# Patient Record
Sex: MALE | Race: WHITE | NOT HISPANIC OR LATINO | URBAN - METROPOLITAN AREA
[De-identification: names, ages, dates, MRNs, and addresses within clinical notes are randomized per-mention and may not be internally consistent; named-entity substitution may affect disease eponyms.]

---

## 2023-07-31 ENCOUNTER — INPATIENT (INPATIENT)
Facility: HOSPITAL | Age: 58
LOS: 0 days | Discharge: HOME | DRG: 918 | End: 2023-08-01
Attending: STUDENT IN AN ORGANIZED HEALTH CARE EDUCATION/TRAINING PROGRAM | Admitting: STUDENT IN AN ORGANIZED HEALTH CARE EDUCATION/TRAINING PROGRAM
Payer: COMMERCIAL

## 2023-07-31 VITALS
SYSTOLIC BLOOD PRESSURE: 172 MMHG | RESPIRATION RATE: 18 BRPM | OXYGEN SATURATION: 99 % | HEIGHT: 70 IN | WEIGHT: 164.91 LBS | TEMPERATURE: 98 F | DIASTOLIC BLOOD PRESSURE: 90 MMHG | HEART RATE: 83 BPM

## 2023-07-31 DIAGNOSIS — L03.90 CELLULITIS, UNSPECIFIED: ICD-10-CM

## 2023-07-31 LAB
ALBUMIN SERPL ELPH-MCNC: 3.8 G/DL — SIGNIFICANT CHANGE UP (ref 3.3–5)
ALP SERPL-CCNC: 59 U/L — SIGNIFICANT CHANGE UP (ref 40–120)
ALT FLD-CCNC: 38 U/L — SIGNIFICANT CHANGE UP (ref 12–78)
ANION GAP SERPL CALC-SCNC: 7 MMOL/L — SIGNIFICANT CHANGE UP (ref 5–17)
APPEARANCE UR: CLEAR — SIGNIFICANT CHANGE UP
APTT BLD: 31.3 SEC — SIGNIFICANT CHANGE UP (ref 24.5–35.6)
AST SERPL-CCNC: 19 U/L — SIGNIFICANT CHANGE UP (ref 15–37)
BASOPHILS # BLD AUTO: 0.03 K/UL — SIGNIFICANT CHANGE UP (ref 0–0.2)
BASOPHILS NFR BLD AUTO: 0.4 % — SIGNIFICANT CHANGE UP (ref 0–2)
BILIRUB SERPL-MCNC: 1.7 MG/DL — HIGH (ref 0.2–1.2)
BILIRUB UR-MCNC: NEGATIVE — SIGNIFICANT CHANGE UP
BUN SERPL-MCNC: 9 MG/DL — SIGNIFICANT CHANGE UP (ref 7–23)
CALCIUM SERPL-MCNC: 8.7 MG/DL — SIGNIFICANT CHANGE UP (ref 8.5–10.1)
CHLORIDE SERPL-SCNC: 106 MMOL/L — SIGNIFICANT CHANGE UP (ref 96–108)
CO2 SERPL-SCNC: 28 MMOL/L — SIGNIFICANT CHANGE UP (ref 22–31)
COLOR SPEC: YELLOW — SIGNIFICANT CHANGE UP
CREAT SERPL-MCNC: 1.2 MG/DL — SIGNIFICANT CHANGE UP (ref 0.5–1.3)
DIFF PNL FLD: NEGATIVE — SIGNIFICANT CHANGE UP
EGFR: 70 ML/MIN/1.73M2 — SIGNIFICANT CHANGE UP
EOSINOPHIL # BLD AUTO: 0.29 K/UL — SIGNIFICANT CHANGE UP (ref 0–0.5)
EOSINOPHIL NFR BLD AUTO: 3.6 % — SIGNIFICANT CHANGE UP (ref 0–6)
GLUCOSE SERPL-MCNC: 109 MG/DL — HIGH (ref 70–99)
GLUCOSE UR QL: 100 MG/DL
HCT VFR BLD CALC: 48.3 % — SIGNIFICANT CHANGE UP (ref 39–50)
HGB BLD-MCNC: 16.1 G/DL — SIGNIFICANT CHANGE UP (ref 13–17)
IMM GRANULOCYTES NFR BLD AUTO: 0.4 % — SIGNIFICANT CHANGE UP (ref 0–0.9)
INR BLD: 0.97 RATIO — SIGNIFICANT CHANGE UP (ref 0.85–1.18)
KETONES UR-MCNC: NEGATIVE MG/DL — SIGNIFICANT CHANGE UP
LACTATE SERPL-SCNC: 1.8 MMOL/L — SIGNIFICANT CHANGE UP (ref 0.7–2)
LEUKOCYTE ESTERASE UR-ACNC: NEGATIVE — SIGNIFICANT CHANGE UP
LYMPHOCYTES # BLD AUTO: 1.37 K/UL — SIGNIFICANT CHANGE UP (ref 1–3.3)
LYMPHOCYTES # BLD AUTO: 17.1 % — SIGNIFICANT CHANGE UP (ref 13–44)
MCHC RBC-ENTMCNC: 29.3 PG — SIGNIFICANT CHANGE UP (ref 27–34)
MCHC RBC-ENTMCNC: 33.3 GM/DL — SIGNIFICANT CHANGE UP (ref 32–36)
MCV RBC AUTO: 87.8 FL — SIGNIFICANT CHANGE UP (ref 80–100)
MONOCYTES # BLD AUTO: 0.73 K/UL — SIGNIFICANT CHANGE UP (ref 0–0.9)
MONOCYTES NFR BLD AUTO: 9.1 % — SIGNIFICANT CHANGE UP (ref 2–14)
NEUTROPHILS # BLD AUTO: 5.57 K/UL — SIGNIFICANT CHANGE UP (ref 1.8–7.4)
NEUTROPHILS NFR BLD AUTO: 69.4 % — SIGNIFICANT CHANGE UP (ref 43–77)
NITRITE UR-MCNC: NEGATIVE — SIGNIFICANT CHANGE UP
NRBC # BLD: 0 /100 WBCS — SIGNIFICANT CHANGE UP (ref 0–0)
PH UR: 5.5 — SIGNIFICANT CHANGE UP (ref 5–8)
PLATELET # BLD AUTO: 200 K/UL — SIGNIFICANT CHANGE UP (ref 150–400)
POTASSIUM SERPL-MCNC: 3.9 MMOL/L — SIGNIFICANT CHANGE UP (ref 3.5–5.3)
POTASSIUM SERPL-SCNC: 3.9 MMOL/L — SIGNIFICANT CHANGE UP (ref 3.5–5.3)
PROT SERPL-MCNC: 7.6 G/DL — SIGNIFICANT CHANGE UP (ref 6–8.3)
PROT UR-MCNC: NEGATIVE MG/DL — SIGNIFICANT CHANGE UP
PROTHROM AB SERPL-ACNC: 11.4 SEC — SIGNIFICANT CHANGE UP (ref 9.5–13)
RBC # BLD: 5.5 M/UL — SIGNIFICANT CHANGE UP (ref 4.2–5.8)
RBC # FLD: 13.2 % — SIGNIFICANT CHANGE UP (ref 10.3–14.5)
SODIUM SERPL-SCNC: 141 MMOL/L — SIGNIFICANT CHANGE UP (ref 135–145)
SP GR SPEC: 1.01 — SIGNIFICANT CHANGE UP (ref 1–1.03)
UROBILINOGEN FLD QL: 0.2 MG/DL — SIGNIFICANT CHANGE UP (ref 0.2–1)
WBC # BLD: 8.02 K/UL — SIGNIFICANT CHANGE UP (ref 3.8–10.5)
WBC # FLD AUTO: 8.02 K/UL — SIGNIFICANT CHANGE UP (ref 3.8–10.5)

## 2023-07-31 PROCEDURE — 93010 ELECTROCARDIOGRAM REPORT: CPT

## 2023-07-31 PROCEDURE — 99285 EMERGENCY DEPT VISIT HI MDM: CPT

## 2023-07-31 RX ORDER — DIPHENHYDRAMINE HCL 50 MG
50 CAPSULE ORAL ONCE
Refills: 0 | Status: COMPLETED | OUTPATIENT
Start: 2023-07-31 | End: 2023-07-31

## 2023-07-31 RX ORDER — VANCOMYCIN HCL 1 G
1000 VIAL (EA) INTRAVENOUS ONCE
Refills: 0 | Status: COMPLETED | OUTPATIENT
Start: 2023-07-31 | End: 2023-07-31

## 2023-07-31 RX ORDER — SODIUM CHLORIDE 9 MG/ML
1000 INJECTION INTRAMUSCULAR; INTRAVENOUS; SUBCUTANEOUS ONCE
Refills: 0 | Status: COMPLETED | OUTPATIENT
Start: 2023-07-31 | End: 2023-07-31

## 2023-07-31 RX ORDER — PIPERACILLIN AND TAZOBACTAM 4; .5 G/20ML; G/20ML
3.38 INJECTION, POWDER, LYOPHILIZED, FOR SOLUTION INTRAVENOUS ONCE
Refills: 0 | Status: COMPLETED | OUTPATIENT
Start: 2023-07-31 | End: 2023-07-31

## 2023-07-31 RX ORDER — PIPERACILLIN AND TAZOBACTAM 4; .5 G/20ML; G/20ML
3.38 INJECTION, POWDER, LYOPHILIZED, FOR SOLUTION INTRAVENOUS ONCE
Refills: 0 | Status: COMPLETED | OUTPATIENT
Start: 2023-08-01 | End: 2023-08-01

## 2023-07-31 RX ADMIN — Medication 1000 MILLIGRAM(S): at 23:45

## 2023-07-31 RX ADMIN — SODIUM CHLORIDE 1000 MILLILITER(S): 9 INJECTION INTRAMUSCULAR; INTRAVENOUS; SUBCUTANEOUS at 23:29

## 2023-07-31 RX ADMIN — PIPERACILLIN AND TAZOBACTAM 3.38 GRAM(S): 4; .5 INJECTION, POWDER, LYOPHILIZED, FOR SOLUTION INTRAVENOUS at 23:00

## 2023-07-31 RX ADMIN — PIPERACILLIN AND TAZOBACTAM 200 GRAM(S): 4; .5 INJECTION, POWDER, LYOPHILIZED, FOR SOLUTION INTRAVENOUS at 22:15

## 2023-07-31 RX ADMIN — Medication 50 MILLIGRAM(S): at 22:14

## 2023-07-31 RX ADMIN — Medication 250 MILLIGRAM(S): at 22:45

## 2023-07-31 RX ADMIN — SODIUM CHLORIDE 1000 MILLILITER(S): 9 INJECTION INTRAMUSCULAR; INTRAVENOUS; SUBCUTANEOUS at 22:15

## 2023-07-31 NOTE — ED ADULT NURSE NOTE - OBJECTIVE STATEMENT
pt had bee sting yesterday at 5 pm on left lower ulnar area. today arm swelling, pain, redness traveling up to left upper inner arm and no experiencing some throat tightness. pt denies chest pain, sob, distress. no difficulty breathing at this time. pt reports chills since 4 p, today but no fever. took antihistamine at 2000 with advil and tylenol

## 2023-07-31 NOTE — ED PROVIDER NOTE - CLINICAL SUMMARY MEDICAL DECISION MAKING FREE TEXT BOX
acute left forearm pain, swelling, warmth and erythema,   red lymphatic streak  going up left arm IVAB, admit to medicine

## 2023-07-31 NOTE — ED ADULT NURSE NOTE - CHIEF COMPLAINT QUOTE
pt reports bee sting to left forearm yesterday. pt now has swelling and redness going up left arm with chills. pt took an antihistamine today Prenatal Vitamins

## 2023-07-31 NOTE — ED PROVIDER NOTE - SKIN, MLM
Skin left forearm pain, swelling, warmth and erythema x 1 day, he has a  red lymphatic streak  going up left arm

## 2023-07-31 NOTE — ED ADULT TRIAGE NOTE - CHIEF COMPLAINT QUOTE
pt reports bee sting to left forearm yesterday. pt now has swelling and redness going up left arm with chills. pt took an antihistamine today

## 2023-07-31 NOTE — ED ADULT NURSE NOTE - NSFALLUNIVINTERV_ED_ALL_ED
Bed/Stretcher in lowest position, wheels locked, appropriate side rails in place/Call bell, personal items and telephone in reach/Instruct patient to call for assistance before getting out of bed/chair/stretcher/Non-slip footwear applied when patient is off stretcher/[place] to call system/Physically safe environment - no spills, clutter or unnecessary equipment/Purposeful proactive rounding/Room/bathroom lighting operational, light cord in reach

## 2023-07-31 NOTE — ED PROVIDER NOTE - OTHER FINDINGS
normal
Within functional limits/no evidence of green dye in suctioned secretions immediately following and 1 hour later

## 2023-07-31 NOTE — ED PROVIDER NOTE - OBJECTIVE STATEMENT
pt reports bee sting to left forearm yesterday. pt now has swelling and redness going up left arm with chills. pt took an antihistamine today  bite, insect pt reports bee sting to left forearm yesterday. pt now has swelling and redness going up left arm with chills. pt took an antihistamine today  left arm cellulitis, lymphangitis due to an insect sting x 24 hours, he had chills, no fever pt reports bee sting to left forearm yesterday. pt now has swelling and redness going up left arm with chills. pt took an antihistamine today  59 y/o male with left arm cellulitis, lymphangitis due to an insect sting x 24 hours, he had chills, no fever 59 y/o male C/C insect sting that developed into left forearm pain, swelling, warmth and erythema x 1 day, he has a  red lymphatic streak  going up left arm. He experienced  chills, no fever. pt took an antihistamine today

## 2023-07-31 NOTE — ED PROVIDER NOTE - ASSOCIATED SYMPTOMS
left forearm pain, swelling, warmth and erythema x 1 day, he has a  red lymphatic streak  going up left arm

## 2023-07-31 NOTE — ED ADULT TRIAGE NOTE - PAIN RATING/NUMBER SCALE (0-10): ACTIVITY
Please call his daughter and find out how he is doing.  I have not seen him since November.  Let daughter know his dentist wanted me to clear him to be given a sedative, and I just wanted to make sure there had been no change in his health, no breathing problems, no decrease in strength, no sleeping problems.    8 (severe pain)

## 2023-08-01 VITALS
TEMPERATURE: 98 F | HEART RATE: 73 BPM | DIASTOLIC BLOOD PRESSURE: 80 MMHG | OXYGEN SATURATION: 97 % | RESPIRATION RATE: 18 BRPM | SYSTOLIC BLOOD PRESSURE: 154 MMHG

## 2023-08-01 DIAGNOSIS — Z98.890 OTHER SPECIFIED POSTPROCEDURAL STATES: Chronic | ICD-10-CM

## 2023-08-01 DIAGNOSIS — Z90.49 ACQUIRED ABSENCE OF OTHER SPECIFIED PARTS OF DIGESTIVE TRACT: Chronic | ICD-10-CM

## 2023-08-01 DIAGNOSIS — R03.0 ELEVATED BLOOD-PRESSURE READING, WITHOUT DIAGNOSIS OF HYPERTENSION: ICD-10-CM

## 2023-08-01 DIAGNOSIS — I89.1 LYMPHANGITIS: ICD-10-CM

## 2023-08-01 DIAGNOSIS — E78.5 HYPERLIPIDEMIA, UNSPECIFIED: ICD-10-CM

## 2023-08-01 LAB
ALBUMIN SERPL ELPH-MCNC: 3.4 G/DL — SIGNIFICANT CHANGE UP (ref 3.3–5)
ALP SERPL-CCNC: 56 U/L — SIGNIFICANT CHANGE UP (ref 40–120)
ALT FLD-CCNC: 40 U/L — SIGNIFICANT CHANGE UP (ref 12–78)
ANION GAP SERPL CALC-SCNC: 7 MMOL/L — SIGNIFICANT CHANGE UP (ref 5–17)
AST SERPL-CCNC: 26 U/L — SIGNIFICANT CHANGE UP (ref 15–37)
BASOPHILS # BLD AUTO: 0.03 K/UL — SIGNIFICANT CHANGE UP (ref 0–0.2)
BASOPHILS NFR BLD AUTO: 0.5 % — SIGNIFICANT CHANGE UP (ref 0–2)
BILIRUB SERPL-MCNC: 2.1 MG/DL — HIGH (ref 0.2–1.2)
BUN SERPL-MCNC: 8 MG/DL — SIGNIFICANT CHANGE UP (ref 7–23)
CALCIUM SERPL-MCNC: 8.3 MG/DL — LOW (ref 8.5–10.1)
CHLORIDE SERPL-SCNC: 109 MMOL/L — HIGH (ref 96–108)
CHOLEST SERPL-MCNC: 171 MG/DL — SIGNIFICANT CHANGE UP
CO2 SERPL-SCNC: 26 MMOL/L — SIGNIFICANT CHANGE UP (ref 22–31)
CREAT SERPL-MCNC: 1.2 MG/DL — SIGNIFICANT CHANGE UP (ref 0.5–1.3)
EGFR: 70 ML/MIN/1.73M2 — SIGNIFICANT CHANGE UP
EOSINOPHIL # BLD AUTO: 0.26 K/UL — SIGNIFICANT CHANGE UP (ref 0–0.5)
EOSINOPHIL NFR BLD AUTO: 4.3 % — SIGNIFICANT CHANGE UP (ref 0–6)
GLUCOSE SERPL-MCNC: 106 MG/DL — HIGH (ref 70–99)
HCT VFR BLD CALC: 46.4 % — SIGNIFICANT CHANGE UP (ref 39–50)
HDLC SERPL-MCNC: 33 MG/DL — LOW
HGB BLD-MCNC: 15.5 G/DL — SIGNIFICANT CHANGE UP (ref 13–17)
IMM GRANULOCYTES NFR BLD AUTO: 0.3 % — SIGNIFICANT CHANGE UP (ref 0–0.9)
LIPID PNL WITH DIRECT LDL SERPL: 119 MG/DL — HIGH
LYMPHOCYTES # BLD AUTO: 1.17 K/UL — SIGNIFICANT CHANGE UP (ref 1–3.3)
LYMPHOCYTES # BLD AUTO: 19.5 % — SIGNIFICANT CHANGE UP (ref 13–44)
MCHC RBC-ENTMCNC: 28.9 PG — SIGNIFICANT CHANGE UP (ref 27–34)
MCHC RBC-ENTMCNC: 33.4 GM/DL — SIGNIFICANT CHANGE UP (ref 32–36)
MCV RBC AUTO: 86.4 FL — SIGNIFICANT CHANGE UP (ref 80–100)
MONOCYTES # BLD AUTO: 0.57 K/UL — SIGNIFICANT CHANGE UP (ref 0–0.9)
MONOCYTES NFR BLD AUTO: 9.5 % — SIGNIFICANT CHANGE UP (ref 2–14)
NEUTROPHILS # BLD AUTO: 3.96 K/UL — SIGNIFICANT CHANGE UP (ref 1.8–7.4)
NEUTROPHILS NFR BLD AUTO: 65.9 % — SIGNIFICANT CHANGE UP (ref 43–77)
NON HDL CHOLESTEROL: 139 MG/DL — HIGH
NRBC # BLD: 0 /100 WBCS — SIGNIFICANT CHANGE UP (ref 0–0)
PLATELET # BLD AUTO: 186 K/UL — SIGNIFICANT CHANGE UP (ref 150–400)
POTASSIUM SERPL-MCNC: 4.2 MMOL/L — SIGNIFICANT CHANGE UP (ref 3.5–5.3)
POTASSIUM SERPL-SCNC: 4.2 MMOL/L — SIGNIFICANT CHANGE UP (ref 3.5–5.3)
PROT SERPL-MCNC: 7 G/DL — SIGNIFICANT CHANGE UP (ref 6–8.3)
RBC # BLD: 5.37 M/UL — SIGNIFICANT CHANGE UP (ref 4.2–5.8)
RBC # FLD: 13.1 % — SIGNIFICANT CHANGE UP (ref 10.3–14.5)
SODIUM SERPL-SCNC: 142 MMOL/L — SIGNIFICANT CHANGE UP (ref 135–145)
TRIGL SERPL-MCNC: 106 MG/DL — SIGNIFICANT CHANGE UP
WBC # BLD: 6.01 K/UL — SIGNIFICANT CHANGE UP (ref 3.8–10.5)
WBC # FLD AUTO: 6.01 K/UL — SIGNIFICANT CHANGE UP (ref 3.8–10.5)

## 2023-08-01 PROCEDURE — 96365 THER/PROPH/DIAG IV INF INIT: CPT

## 2023-08-01 PROCEDURE — 99285 EMERGENCY DEPT VISIT HI MDM: CPT | Mod: 25

## 2023-08-01 PROCEDURE — 96375 TX/PRO/DX INJ NEW DRUG ADDON: CPT

## 2023-08-01 PROCEDURE — 81003 URINALYSIS AUTO W/O SCOPE: CPT

## 2023-08-01 PROCEDURE — 99223 1ST HOSP IP/OBS HIGH 75: CPT | Mod: GC

## 2023-08-01 PROCEDURE — 99253 IP/OBS CNSLTJ NEW/EST LOW 45: CPT

## 2023-08-01 PROCEDURE — 85025 COMPLETE CBC W/AUTO DIFF WBC: CPT

## 2023-08-01 PROCEDURE — 85610 PROTHROMBIN TIME: CPT

## 2023-08-01 PROCEDURE — 80053 COMPREHEN METABOLIC PANEL: CPT

## 2023-08-01 PROCEDURE — 96368 THER/DIAG CONCURRENT INF: CPT

## 2023-08-01 PROCEDURE — 99239 HOSP IP/OBS DSCHRG MGMT >30: CPT

## 2023-08-01 PROCEDURE — 80061 LIPID PANEL: CPT

## 2023-08-01 PROCEDURE — 93005 ELECTROCARDIOGRAM TRACING: CPT

## 2023-08-01 PROCEDURE — 85730 THROMBOPLASTIN TIME PARTIAL: CPT

## 2023-08-01 PROCEDURE — 87086 URINE CULTURE/COLONY COUNT: CPT

## 2023-08-01 PROCEDURE — 36415 COLL VENOUS BLD VENIPUNCTURE: CPT

## 2023-08-01 PROCEDURE — 83605 ASSAY OF LACTIC ACID: CPT

## 2023-08-01 PROCEDURE — 87040 BLOOD CULTURE FOR BACTERIA: CPT

## 2023-08-01 RX ORDER — DIPHENHYDRAMINE HCL 50 MG
25 CAPSULE ORAL EVERY 12 HOURS
Refills: 0 | Status: DISCONTINUED | OUTPATIENT
Start: 2023-08-01 | End: 2023-08-01

## 2023-08-01 RX ORDER — VANCOMYCIN HCL 1 G
1000 VIAL (EA) INTRAVENOUS EVERY 12 HOURS
Refills: 0 | Status: DISCONTINUED | OUTPATIENT
Start: 2023-08-01 | End: 2023-08-01

## 2023-08-01 RX ORDER — LACTOBACILLUS ACIDOPHILUS 100MM CELL
1 CAPSULE ORAL
Qty: 12 | Refills: 0
Start: 2023-08-01 | End: 2023-08-06

## 2023-08-01 RX ORDER — CEPHALEXIN 500 MG
1 CAPSULE ORAL
Qty: 12 | Refills: 0
Start: 2023-08-01 | End: 2023-08-06

## 2023-08-01 RX ORDER — DIPHENHYDRAMINE HCL 50 MG
1 CAPSULE ORAL
Qty: 10 | Refills: 0
Start: 2023-08-01 | End: 2023-08-05

## 2023-08-01 RX ORDER — LACTOBACILLUS ACIDOPHILUS 100MM CELL
1 CAPSULE ORAL
Refills: 0 | Status: DISCONTINUED | OUTPATIENT
Start: 2023-08-01 | End: 2023-08-01

## 2023-08-01 RX ORDER — ONDANSETRON 8 MG/1
4 TABLET, FILM COATED ORAL EVERY 8 HOURS
Refills: 0 | Status: DISCONTINUED | OUTPATIENT
Start: 2023-08-01 | End: 2023-08-01

## 2023-08-01 RX ORDER — ACETAMINOPHEN 500 MG
650 TABLET ORAL EVERY 6 HOURS
Refills: 0 | Status: DISCONTINUED | OUTPATIENT
Start: 2023-08-01 | End: 2023-08-01

## 2023-08-01 RX ORDER — PIPERACILLIN AND TAZOBACTAM 4; .5 G/20ML; G/20ML
3.38 INJECTION, POWDER, LYOPHILIZED, FOR SOLUTION INTRAVENOUS EVERY 8 HOURS
Refills: 0 | Status: DISCONTINUED | OUTPATIENT
Start: 2023-08-01 | End: 2023-08-01

## 2023-08-01 RX ORDER — LANOLIN ALCOHOL/MO/W.PET/CERES
3 CREAM (GRAM) TOPICAL AT BEDTIME
Refills: 0 | Status: DISCONTINUED | OUTPATIENT
Start: 2023-08-01 | End: 2023-08-01

## 2023-08-01 RX ORDER — CEPHALEXIN 500 MG
500 CAPSULE ORAL EVERY 12 HOURS
Refills: 0 | Status: DISCONTINUED | OUTPATIENT
Start: 2023-08-01 | End: 2023-08-01

## 2023-08-01 RX ADMIN — PIPERACILLIN AND TAZOBACTAM 25 GRAM(S): 4; .5 INJECTION, POWDER, LYOPHILIZED, FOR SOLUTION INTRAVENOUS at 09:30

## 2023-08-01 RX ADMIN — PIPERACILLIN AND TAZOBACTAM 25 GRAM(S): 4; .5 INJECTION, POWDER, LYOPHILIZED, FOR SOLUTION INTRAVENOUS at 01:02

## 2023-08-01 NOTE — DISCHARGE NOTE PROVIDER - CARE PROVIDER_API CALL
taran wolf  [address]  [place] [address]  Phone: [phone]  Fax: (   )    -  Follow Up Time: 1 week

## 2023-08-01 NOTE — H&P ADULT - PROBLEM SELECTOR PLAN 3
Chronic, however pt reports myalgias with use  - Pt previously on Atorvastatin in past and transitioned to Pravastatin 20 mg with continued muscle aches   - Now takes 10 mg every other day at his own discretion, MD unaware   - AM lipid panel, follow up results   - Statin held at this time

## 2023-08-01 NOTE — DISCHARGE NOTE PROVIDER - NSDCMRMEDTOKEN_GEN_ALL_CORE_FT
cephalexin 500 mg oral capsule: 1 cap(s) orally every 12 hours  diphenhydrAMINE 25 mg oral capsule: 1 cap(s) orally every 12 hours  lactobacillus acidophilus oral capsule: 1 tab(s) orally 2 times a day  pravastatin 20 mg oral tablet: 1 tab(s) orally once a day

## 2023-08-01 NOTE — CARE COORDINATION ASSESSMENT. - NSCAREPROVIDERS_GEN_ALL_CORE_FT
CARE PROVIDERS:  Accepting Physician: Briana Weiss  Administration: Santy Palm  Admitting: Briana Weiss  Attending: Briana Weiss  Case Management: Clifford Lugo  Consultant: Amanda Ariza ED Attending: Herbie Tesfaye  ED Nurse: Cr Lowry  Nurse: Maia Satnillan  Nurse: Winifred Sheppard  Nurse: Milton Billings  Ordered: ADM, User  Override: Winifred Sheppard  Override: Miltno Billings  PCA/Nursing Assistant: Maia Sears  PCA/Nursing Assistant: Hillary Red  Primary Team: Weston Landaverde  Primary Team: Priscila Quinonez  Primary Team: Briana Weiss  Primary Team: Rakan Nicholson  Registered Dietitian: Ruba White  UR// Supp. Assoc.: Dorothy Chandler  UR// Supp. Assoc.: Aimee Tobin

## 2023-08-01 NOTE — H&P ADULT - HISTORY OF PRESENT ILLNESS
57 yo M with PMHx of HLD presents to the ED with worsening swelling of his left arm following an insect sting. Pt states a on 7/30 at 1700 pt felt a sharp pain in his left wrist and found a stinger. Pt removed the stinger and appreciated minimal swelling and pain at the site for the remainder of the evening. The following morning ot states that he noticed worsening swelling, redness, and tingling in his throat. Pt took Alavert and benadryl with minimal improvement. Pt decided to come to the ED when he noticed the erythema extending up his left arm with more pronounced swelling. Pt endorses numbness and tingling in the left upper extremity with intermittent chills. Denies fever. No other complaints at this time.     ED Course:   Vitals: BP: 172/90, HR: 80, Temp: 98.4, RR: 18, SpO2: 98% on RA   Labs:  WBC 8.02, Cr 1.20  UA: negative   Received in the ED: Vanco IVP, Zosyn IVPB x2, 1L NS bolus, benadryl 50 mg IVP    59 yo M with PMHx of HLD presents to the ED with worsening swelling of his left arm following an insect sting. Pt states a on 7/30 at 1700 pt felt a sharp pain in his left wrist and found a stinger. Pt removed the stinger and appreciated minimal swelling and pain at the site for the remainder of the evening. The following morning ot states that he noticed worsening swelling, redness, and tingling in his throat. Pt took Alavert and benadryl with minimal improvement. Pt decided to come to the ED when he noticed the erythema extending up his left arm with more pronounced swelling. Pt endorses numbness and tingling in the left upper extremity with intermittent chills. Denies fever. No other complaints at this time.   ED Course:   Vitals: BP: 172/90, HR: 80, Temp: 98.4, RR: 18, SpO2: 98% on RA   Labs:  WBC 8.02, Cr 1.20  UA: negative   Received in the ED: Vanco IVP, Zosyn IVPB x2, 1L NS bolus, benadryl 50 mg IVP

## 2023-08-01 NOTE — H&P ADULT - ASSESSMENT
57 yo M with PMHx of HLD presents to the ED with worsening swelling of his left arm following an insect sting, admitted for lymphangitis

## 2023-08-01 NOTE — H&P ADULT - NSHPSOCIALHISTORY_GEN_ALL_CORE
Lives: Wife and adult children   ADLs: Independent   Diet: No restrictions   Vaccination: Moderna x4   Alcohol Use: 2-3 drinks, 3x/week   Tobacco Use: 3 pack year hx, 1 pack per week, quit 18 years ago   Recreational Drug Use: Marijuana gummies 1x/week

## 2023-08-01 NOTE — H&P ADULT - NSICDXPASTSURGICALHX_GEN_ALL_CORE_FT
PAST SURGICAL HISTORY:  S/P cholecystectomy     S/P hernia repair     S/P rotator cuff repair     Status post repair of glenoid labrum

## 2023-08-01 NOTE — CARE COORDINATION ASSESSMENT. - NSPASTMEDSURGHISTORY_GEN_ALL_CORE_FT
PAST MEDICAL & SURGICAL HISTORY:  Hyperlipidemia      Status post repair of glenoid labrum      S/P rotator cuff repair      S/P cholecystectomy      S/P hernia repair

## 2023-08-01 NOTE — H&P ADULT - ATTENDING COMMENTS
59 yo M with PMHx of HLD presents to the ED with worsening swelling of his left arm following an insect sting, admitted for lymphangitis     Agree with above. Edited where appropriate

## 2023-08-01 NOTE — DISCHARGE NOTE NURSING/CASE MANAGEMENT/SOCIAL WORK - PATIENT PORTAL LINK FT
You can access the FollowMyHealth Patient Portal offered by [facility] by registering at the following website: [email or web address]. By joining [facility]’s FollowMyHealth portal, you will also be able to view your health information using other applications (apps) compatible with our system.

## 2023-08-01 NOTE — DISCHARGE NOTE PROVIDER - DISCHARGING ATTENDING PHYSICIAN:
Vital Signs Last 24 Hrs  T(C): 36.6 (16 Sep 2020 04:14), Max: 37.2 (15 Sep 2020 17:27)  T(F): 97.9 (16 Sep 2020 04:14), Max: 98.9 (15 Sep 2020 17:27)  HR: 68 (16 Sep 2020 06:34) (66 - 80)  BP: 144/72 (16 Sep 2020 06:34) (130/72 - 163/78)  BP(mean): --  RR: 18 (16 Sep 2020 04:14) (16 - 18)  SpO2: 98% (16 Sep 2020 04:14) (98% - 98%)
Rakan Nicholson

## 2023-08-01 NOTE — H&P ADULT - NSHPPHYSICALEXAM_GEN_ALL_CORE
T(C): 36.9 (07-31-23 @ 19:48), Max: 36.9 (07-31-23 @ 19:48)  HR: 80 (07-31-23 @ 21:36) (80 - 83)  BP: 172/90 (07-31-23 @ 19:48) (172/90 - 172/90)  RR: 18 (07-31-23 @ 21:36) (18 - 18)  SpO2: 98% (07-31-23 @ 21:36) (98% - 99%)    GENERAL: patient appears well, no acute distress, appropriately interactive  EYES: sclera clear, no exudates  ENMT: oropharynx clear without erythema, no exudates, moist mucous membranes  NECK: supple, soft  LUNGS: good air entry bilaterally, clear to auscultation, symmetric breath sounds, no wheezing or rhonchi appreciated  HEART: soft S1/S2, regular rate and rhythm, no murmurs noted, no lower extremity edema  GASTROINTESTINAL: abdomen is soft, nontender, nondistended, normoactive bowel sounds  EXTREMITY: 1mm sting bhavani on medial aspect of left wrist, erythema and edema extending up to the medial aspect of the left bicep, boarders marked with pen   LYMPHATIC: No palpable lymphadenopathy in LUE   INTEGUMENT: good skin turgor, warm skin, appears well perfused  NEUROLOGIC: awake, alert, oriented x3, good muscle tone in all 4 extremities  HEME/LYMPH: no obvious ecchymosis or petechiae T(C): 36.9 (07-31-23 @ 19:48), Max: 36.9 (07-31-23 @ 19:48)  HR: 80 (07-31-23 @ 21:36) (80 - 83)  BP: 172/90 (07-31-23 @ 19:48) (172/90 - 172/90)  RR: 18 (07-31-23 @ 21:36) (18 - 18)  SpO2: 98% (07-31-23 @ 21:36) (98% - 99%)  GENERAL: patient appears well, no acute distress, appropriately interactive  EYES: sclera clear, no exudates  ENMT: oropharynx clear without erythema, no exudates, moist mucous membranes  NECK: supple, soft  LUNGS: good air entry bilaterally, clear to auscultation, symmetric breath sounds, no wheezing or rhonchi appreciated  HEART: soft S1/S2, regular rate and rhythm, no murmurs noted, no lower extremity edema  GASTROINTESTINAL: abdomen is soft, nontender, nondistended, normoactive bowel sounds  EXTREMITY: 1mm sting bhavani on medial aspect of left wrist, erythema and edema extending up to the medial aspect of the left bicep, boarders marked with pen   LYMPHATIC: No palpable lymphadenopathy in LUE   INTEGUMENT: good skin turgor, warm skin, appears well perfused  NEUROLOGIC: awake, alert, oriented x3, good muscle tone in all 4 extremities  HEME/LYMPH: no obvious ecchymosis or petechiae

## 2023-08-01 NOTE — DISCHARGE NOTE PROVIDER - NSDCCPCAREPLAN_GEN_ALL_CORE_FT
PRINCIPAL DISCHARGE DIAGNOSIS  Diagnosis: Lymphangitis  Assessment and Plan of Treatment: Arm elevation recommended, continue benadryl and complete cours oe fkeflex for antibitics as directed.  Follow up with PMD by next week.

## 2023-08-01 NOTE — DISCHARGE NOTE PROVIDER - HOSPITAL COURSE
HPI:  59 yo M with PMHx of HLD presents to the ED with worsening swelling of his left arm following an insect sting. Pt states a on 7/30 at 1700 pt felt a sharp pain in his left wrist and found a stinger. Pt removed the stinger and appreciated minimal swelling and pain at the site for the remainder of the evening. The following morning ot states that he noticed worsening swelling, redness, and tingling in his throat. Pt took Alavert and benadryl with minimal improvement. Pt decided to come to the ED when he noticed the erythema extending up his left arm with more pronounced swelling. Pt endorses numbness and tingling in the left upper extremity with intermittent chills. Denies fever. No other complaints at this time.   ED Course:   Vitals: BP: 172/90, HR: 80, Temp: 98.4, RR: 18, SpO2: 98% on RA   Labs:  WBC 8.02, Cr 1.20  UA: negative   Received in the ED: Vanco IVP, Zosyn IVPB x2, 1L NS bolus, benadryl 50 mg IVP    (01 Aug 2023 02:04)    HOSPITAL COURSE:    Patient felt much better after he was given benadryl IV in ED, and the swelling in his arm, along with erythematous track subsequently subsided. Pt was continued on IV abx, and was evaluated by Dr. Ariza in ED, who recommended changing over to PO keflex to complete a 7 day course of abx, and continue benadryl po, and arm elevation.  Patient live sin [place] and will follow up with PMD there.    REVIEW OF SYSTEMS:    CONSTITUTIONAL: No weakness, fevers or chills  EYES/ENT: No visual changes, no throat pain   RESPIRATORY: No cough, wheezing, hemoptysis; No shortness of breath  CARDIOVASCULAR: No chest pain or palpitations  GASTROINTESTINAL: No abdominal, nausea, vomiting, or hematemesis; No diarrhea or constipation. No melena or hematochezia.  GENITOURINARY: No dysuria, frequency or hematuria  NEUROLOGICAL: No dizziness, numbness, or weakness  SKIN: No itching, burning, rashes, or lesions   All other review of systems is negative unless indicated above.    VITAL SIGNS:    Vital Signs Last 24 Hrs  T(C): 36.4 (01 Aug 2023 05:11), Max: 36.9 (31 Jul 2023 19:48)  T(F): 97.6 (01 Aug 2023 05:11), Max: 98.4 (31 Jul 2023 19:48)  HR: 54 (01 Aug 2023 05:11) (54 - 83)  BP: 136/85 (01 Aug 2023 05:11) (127/73 - 172/90)  BP(mean): --  RR: 16 (01 Aug 2023 05:11) (16 - 18)  SpO2: 95% (01 Aug 2023 05:11) (95% - 99%)    Parameters below as of 01 Aug 2023 05:11  Patient On (Oxygen Delivery Method): room air        PHYSICAL EXAM:     GENERAL: no acute distress  HEENT: NC/AT, EOMI, neck supple, MMM  RESPIRATORY: LCTAB/L, no rhonchi, rales, or wheezing  CARDIOVASCULAR: RRR, no murmurs, gallops, rubs  ABDOMINAL: soft, non-tender, non-distended, positive bowel sounds   EXTREMITIES: no clubbing, cyanosis, or edema; LUE without erythema, but some tightness and swelling  NEUROLOGICAL: alert and oriented x 3, non-focal  SKIN: no new rashes, no ticks seen  MUSCULOSKELETAL: no gross joint deformity                          15.5   6.01  )-----------( 186      ( 01 Aug 2023 08:40 )             46.4     08-01    142  |  109<H>  |  8   ----------------------------<  106<H>  4.2   |  26  |  1.20    Ca    8.3<L>      01 Aug 2023 08:40    TPro  7.0  /  Alb  3.4  /  TBili  2.1<H>  /  DBili  x   /  AST  26  /  ALT  40  /  AlkPhos  56  08-01      CAPILLARY BLOOD GLUCOSE          MEDICATIONS  (STANDING):  cephalexin 500 milliGRAM(s) Oral every 12 hours  diphenhydrAMINE 25 milliGRAM(s) Oral every 12 hours  lactobacillus acidophilus 1 Tablet(s) Oral two times a day with meals

## 2023-08-01 NOTE — DISCHARGE NOTE PROVIDER - PROVIDER TOKENS
FREE:[LAST:[luciano],FIRST:[taran],PHONE:[phone]],FAX:[(   )    -],ADDRESS:[[address] [place] [address]],FOLLOWUP:[1 week]]

## 2023-08-01 NOTE — CONSULT NOTE ADULT - SUBJECTIVE AND OBJECTIVE BOX
[facility]  INFECTIOUS DISEASES   [address] [place] [address]  Tel: [phone]     Fax: [phone]  ========================================================  MD Marian Zavala Kaushal, MD Cho, Michelle, MD Sunjit, Jaspal, MD  ========================================================    [id number]  [facility]     CC: Patient is a 58y old  Male who presents with a chief complaint of Lymphangitis (01 Aug 2023 02:04)    HPI:  59 yo M with PMHx of HLD presents to the ED with worsening swelling of his left arm following an insect sting. Pt states a on 7/30 at 1700 pt felt a sharp pain in his left wrist and found a stinger. Pt removed the stinger and appreciated minimal swelling and pain at the site for the remainder of the evening. The following morning ot states that he noticed worsening swelling, redness, and tingling in his throat. Pt took Alavert and benadryl with minimal improvement. Pt decided to come to the ED when he noticed the erythema extending up his left arm with more pronounced swelling. Pt endorses numbness and tingling in the left upper extremity with intermittent chills. Denies fever. No other complaints at this time.   ED Course:   Vitals: BP: 172/90, HR: 80, Temp: 98.4, RR: 18, SpO2: 98% on RA   Labs:  WBC 8.02, Cr 1.20  UA: negative   Received in the ED: Vanco IVP, Zosyn IVPB x2, 1L NS bolus, benadryl 50 mg IVP    (01 Aug 2023 02:04)      PAST MEDICAL & SURGICAL HISTORY:  Hyperlipidemia      S/P hernia repair      S/P cholecystectomy      S/P rotator cuff repair      Status post repair of glenoid labrum          Social Hx: No smoking, EtOH or drugs     FAMILY HISTORY:  No pertinent family history in first degree relatives        Allergies    No Known Allergies    Intolerances        Antibiotics:  MEDICATIONS  (STANDING):    MEDICATIONS  (PRN):  acetaminophen     Tablet .. 650 milliGRAM(s) Oral every 6 hours PRN Mild Pain (1 - 3)  aluminum hydroxide/magnesium hydroxide/simethicone Suspension 30 milliLiter(s) Oral every 4 hours PRN Dyspepsia  melatonin 3 milliGRAM(s) Oral at bedtime PRN Insomnia  ondansetron Injectable 4 milliGRAM(s) IV Push every 8 hours PRN Nausea and/or Vomiting       REVIEW OF SYSTEMS:  CONSTITUTIONAL:  No Fever or chills  HEENT:  No diplopia or blurred vision.   CARDIOVASCULAR:  No chest pain or SOB.  RESPIRATORY:  No cough, shortness of breath, PND or orthopnea.  GASTROINTESTINAL:  No nausea, vomiting or diarrhea.  GENITOURINARY:  No dysuria, frequency or urgency. No Blood in urine  MUSCULOSKELETAL:  no joint aches, no muscle pain  SKIN:  swelling of L arm   NEUROLOGIC:  No paresthesias or weakness.      Physical Exam:  Vital Signs Last 24 Hrs  T(C): 36.4 (01 Aug 2023 05:11), Max: 36.9 (31 Jul 2023 19:48)  T(F): 97.6 (01 Aug 2023 05:11), Max: 98.4 (31 Jul 2023 19:48)  HR: 54 (01 Aug 2023 05:11) (54 - 83)  BP: 136/85 (01 Aug 2023 05:11) (127/73 - 172/90)  BP(mean): --  RR: 16 (01 Aug 2023 05:11) (16 - 18)  SpO2: 95% (01 Aug 2023 05:11) (95% - 99%)    Parameters below as of 01 Aug 2023 05:11  Patient On (Oxygen Delivery Method): room air      Height (cm): 177.8 (07-31 @ 19:48)  Weight (kg): 74.8 (07-31 @ 19:48)  BMI (kg/m2): 23.7 (07-31 @ 19:48)  BSA (m2): 1.92 (07-31 @ 19:48)    GEN: NAD  HEENT: normocephalic and atraumatic. EOMI. PERRL.    NECK: Supple.  No lymphadenopathy   LUNGS: Clear to auscultation.  HEART: Regular rate and rhythm without murmur.  ABDOMEN: Soft, nontender, and nondistended.  Positive bowel sounds.    : No CVA tenderness  EXTREMITIES: Without edema.  NEUROLOGIC: grossly intact.  PSYCHIATRIC: Appropriate affect .  SKIN: No rash     Labs:  08-01    142  |  109<H>  |  8   ----------------------------<  106<H>  4.2   |  26  |  1.20    Ca    8.3<L>      01 Aug 2023 08:40    TPro  7.0  /  Alb  3.4  /  TBili  2.1<H>  /  DBili  x   /  AST  26  /  ALT  40  /  AlkPhos  56  08-01                          15.5   6.01  )-----------( 186      ( 01 Aug 2023 08:40 )             46.4     PT/INR - ( 31 Jul 2023 22:15 )   PT: 11.4 sec;   INR: 0.97 ratio         PTT - ( 31 Jul 2023 22:15 )  PTT:31.3 sec  Urinalysis Basic - ( 01 Aug 2023 08:40 )    Color: x / Appearance: x / SG: x / pH: x  Gluc: 106 mg/dL / Ketone: x  / Bili: x / Urobili: x   Blood: x / Protein: x / Nitrite: x   Leuk Esterase: x / RBC: x / WBC x   Sq Epi: x / Non Sq Epi: x / Bacteria: x      LIVER FUNCTIONS - ( 01 Aug 2023 08:40 )  Alb: 3.4 g/dL / Pro: 7.0 g/dL / ALK PHOS: 56 U/L / ALT: 40 U/L / AST: 26 U/L / GGT: x             RECENT CULTURES:      All imaging and other data have been reviewed.      Assessment and Plan:   59 yo M with PMHx of HLD presents to the ED with worsening swelling of his left arm following an insect sting, admitted for lymphangitis. Likely inflammatory reaction to bee sting. Not anaphylactic reaction ( usually acute in setting)      - Patient with no leukocytosis, afebrile overnight , no throat swelling/ feeling of throat closure or swelling of oral mucosa     - + L arm swelling from wrist to elbow, no LAD, erythema track resolved, pulses intact, tightness on palpation, no weakness or numbness of fingers   - will dc vanc and zosyn  - Recommendations: cefazolin 1 gram q8hrs inpatient and if going home take keflex 500mg Q8hrs for total 7 days  - For arm swelling, elevate arm to help with lymph drainage   - Benadryl PRN for pain/ swelling / itching, AVOID operating vehicle while taking anti histamine  - f/u blood cx urine cx   - stable for discharge from ID stand point   - Return to hospital if symptoms get worsens    Thank you for courtesy of this consult.     Will follow.  Discussed with the primary team.     Amanda Ariza MD  Division of Infectious Diseases   Please call ID service at [phone] with any question.    75 minutes spent on total encounter assessing patient, examination, chart review, counseling and coordinating care by the attending physician/nurse/care manager.   [facility]  INFECTIOUS DISEASES   [address] [place] [address]  Tel: [phone]     Fax: [phone]  ========================================================  MD Marian Zavala Kaushal, MD Cho, Michelle, MD Sunjit, Jaspal, MD  ========================================================    [id number]  [facility]     CC: Patient is a 58y old  Male who presents with a chief complaint of Lymphangitis (01 Aug 2023 02:04)    HPI:  57 yo M with PMHx of HLD presents to the ED with worsening swelling of his left arm following an insect sting. Pt states a on 7/30 at 1700 pt felt a sharp pain in his left wrist and found a stinger. Pt removed the stinger and appreciated minimal swelling and pain at the site for the remainder of the evening. The following morning ot states that he noticed worsening swelling, redness, and tingling in his throat. Pt took Alavert and benadryl with minimal improvement. Pt decided to come to the ED when he noticed the erythema extending up his left arm with more pronounced swelling. Pt endorses numbness and tingling in the left upper extremity with intermittent chills. Denies fever. No other complaints at this time.   ED Course:   Vitals: BP: 172/90, HR: 80, Temp: 98.4, RR: 18, SpO2: 98% on RA   Labs:  WBC 8.02, Cr 1.20  UA: negative   Received in the ED: Vanco IVP, Zosyn IVPB x2, 1L NS bolus, benadryl 50 mg IVP    (01 Aug 2023 02:04)      PAST MEDICAL & SURGICAL HISTORY:  Hyperlipidemia      S/P hernia repair      S/P cholecystectomy      S/P rotator cuff repair      Status post repair of glenoid labrum          Social Hx: No smoking, EtOH or drugs     FAMILY HISTORY:  No pertinent family history in first degree relatives        Allergies    No Known Allergies    Intolerances        Antibiotics:  MEDICATIONS  (STANDING):    MEDICATIONS  (PRN):  acetaminophen     Tablet .. 650 milliGRAM(s) Oral every 6 hours PRN Mild Pain (1 - 3)  aluminum hydroxide/magnesium hydroxide/simethicone Suspension 30 milliLiter(s) Oral every 4 hours PRN Dyspepsia  melatonin 3 milliGRAM(s) Oral at bedtime PRN Insomnia  ondansetron Injectable 4 milliGRAM(s) IV Push every 8 hours PRN Nausea and/or Vomiting       REVIEW OF SYSTEMS:  CONSTITUTIONAL:  No Fever or chills  HEENT:  No diplopia or blurred vision.   CARDIOVASCULAR:  No chest pain or SOB.  RESPIRATORY:  No cough, shortness of breath, PND or orthopnea.  GASTROINTESTINAL:  No nausea, vomiting or diarrhea.  GENITOURINARY:  No dysuria, frequency or urgency. No Blood in urine  MUSCULOSKELETAL:  no joint aches, no muscle pain  SKIN:  swelling of L arm   NEUROLOGIC:  No paresthesias or weakness.      Physical Exam:  Vital Signs Last 24 Hrs  T(C): 36.4 (01 Aug 2023 05:11), Max: 36.9 (31 Jul 2023 19:48)  T(F): 97.6 (01 Aug 2023 05:11), Max: 98.4 (31 Jul 2023 19:48)  HR: 54 (01 Aug 2023 05:11) (54 - 83)  BP: 136/85 (01 Aug 2023 05:11) (127/73 - 172/90)  BP(mean): --  RR: 16 (01 Aug 2023 05:11) (16 - 18)  SpO2: 95% (01 Aug 2023 05:11) (95% - 99%)    Parameters below as of 01 Aug 2023 05:11  Patient On (Oxygen Delivery Method): room air      Height (cm): 177.8 (07-31 @ 19:48)  Weight (kg): 74.8 (07-31 @ 19:48)  BMI (kg/m2): 23.7 (07-31 @ 19:48)  BSA (m2): 1.92 (07-31 @ 19:48)    GEN: NAD  HEENT: normocephalic and atraumatic. EOMI. PERRL.    NECK: Supple.  LUNGS: Clear to auscultation.  HEART: Regular rate and rhythm without murmur.  ABDOMEN: Soft, nontender, and nondistended.  Positive bowel sounds.    : No CVA tenderness  EXTREMITIES: - + L arm swelling from wrist to elbow, no LAD, erythema track resolved, pulses intact, tightness on palpation, no weakness or numbness of fingers   NEUROLOGIC: grossly intact.  PSYCHIATRIC: Appropriate affect .       Labs:  08-01    142  |  109<H>  |  8   ----------------------------<  106<H>  4.2   |  26  |  1.20    Ca    8.3<L>      01 Aug 2023 08:40    TPro  7.0  /  Alb  3.4  /  TBili  2.1<H>  /  DBili  x   /  AST  26  /  ALT  40  /  AlkPhos  56  08-01                          15.5   6.01  )-----------( 186      ( 01 Aug 2023 08:40 )             46.4     PT/INR - ( 31 Jul 2023 22:15 )   PT: 11.4 sec;   INR: 0.97 ratio         PTT - ( 31 Jul 2023 22:15 )  PTT:31.3 sec  Urinalysis Basic - ( 01 Aug 2023 08:40 )    Color: x / Appearance: x / SG: x / pH: x  Gluc: 106 mg/dL / Ketone: x  / Bili: x / Urobili: x   Blood: x / Protein: x / Nitrite: x   Leuk Esterase: x / RBC: x / WBC x   Sq Epi: x / Non Sq Epi: x / Bacteria: x      LIVER FUNCTIONS - ( 01 Aug 2023 08:40 )  Alb: 3.4 g/dL / Pro: 7.0 g/dL / ALK PHOS: 56 U/L / ALT: 40 U/L / AST: 26 U/L / GGT: x             RECENT CULTURES:      All imaging and other data have been reviewed.      Assessment and Plan:   57 yo M with PMHx of HLD presents to the ED with worsening swelling of his left arm following an insect sting, admitted for lymphangitis. Likely inflammatory reaction to bee sting. Not anaphylactic reaction ( usually acute in setting)      - Patient with no leukocytosis, afebrile overnight , no throat swelling/ feeling of throat closure or swelling of oral mucosa     - + L arm swelling from wrist to elbow, no LAD, erythema track resolved, pulses intact, tightness on palpation, no weakness or numbness of fingers   - will dc vanc and zosyn  - Recommendations: cefazolin 1 gram q8hrs inpatient and if going home take keflex 500mg Q8hrs for total 7 days  - For arm swelling, elevate arm to help with lymph drainage   - Benadryl PRN for pain/ swelling / itching, AVOID operating vehicle while taking anti histamine  - f/u blood cx urine cx   - stable for discharge from ID stand point   - Return to hospital if symptoms get worsens    Thank you for courtesy of this consult.     Will follow.  Discussed with the primary team.     Amanda Ariza MD  Division of Infectious Diseases   Please call ID service at [phone] with any question.    75 minutes spent on total encounter assessing patient, examination, chart review, counseling and coordinating care by the attending physician/nurse/care manager.

## 2023-08-01 NOTE — H&P ADULT - PROBLEM SELECTOR PLAN 1
Insect sting with worsening edema and notable ascending erythematous track   - S/p Vanco IVP, Zosyn IVPB x2, 1L NS bolus, benadryl 50 mg IVP   - No wbc, afebrile   - No palpable lymph nodes in LUE, erythema demarcated, does not extend beyond boarders    - Resolution of throat tightness following IV benadryl   - Continue Vanc/Zosyn for now   - Infectious Disease consulted, Dr. Ariza, follow up recommendations Insect sting with worsening edema and notable ascending erythematous track   - S/p Vanco IVP, Zosyn IVPB x2, 1L NS bolus, benadryl 50 mg IVP   - No wbc, afebrile   - No palpable lymph nodes in LUE, erythema demarcated, does not extend beyond boarders    - Resolution of throat tightness following IV benadryl   - Continue Vanc/Zosyn for now   - fu mrsa pcr, dc vanco if negative   - Infectious Disease consulted, Dr. Ariza, follow up recommendations

## 2023-08-01 NOTE — CARE COORDINATION ASSESSMENT. - OTHER PERTINENT DISCHARGE PLANNING INFORMATION:
Discussed role of case management with understanding.  Patient admitted with insect bite/sting on left arm and is currently on IVAX.  No anticipated D/C needs currently identified.  Will remain available from a case management perspective

## 2023-08-01 NOTE — H&P ADULT - PROBLEM SELECTOR PLAN 2
172/90 on admission  - S/p IVP Benadryl   - Pt states that he typically gets elevated blood pressures following benadryl   - Continue to monitor

## 2023-08-01 NOTE — CONSULT NOTE ADULT - ATTENDING COMMENTS
Patient has removed a sting from bite area, even though not seen a bee but had experienced it in the past and the same reaction. No angioedema but left arm is swollen but no circulation or neurologic compromised. His symptoms are too early for cellulitis most likely related to inflammatory response to toxin, in case to cover possible cellulitis along with allergic reaction, can continue cefazolin and switch to oral keflex for total one week.   Return to ED with fever or worsening. Elevation or arm would be very helpful. No fever, leukocytosis or alarming signs.

## 2023-08-02 LAB
CULTURE RESULTS: NO GROWTH — SIGNIFICANT CHANGE UP
SPECIMEN SOURCE: SIGNIFICANT CHANGE UP

## 2023-08-06 LAB
CULTURE RESULTS: SIGNIFICANT CHANGE UP
CULTURE RESULTS: SIGNIFICANT CHANGE UP
SPECIMEN SOURCE: SIGNIFICANT CHANGE UP
SPECIMEN SOURCE: SIGNIFICANT CHANGE UP

## 2025-03-27 NOTE — ED PROVIDER NOTE - CROS ED PSYCH ALL NEG
Medication:    Disp Refills Start End    PARoxetine (PAXIL) 30 MG tablet 90 tablet 4 3/22/2024 --    Sig - Route: Take 1 tablet by mouth every morning.       Disp Refills Start End    pravastatin (PRAVACHOL) 20 MG tablet 90 tablet 4 3/22/2024 --    Sig - Route: Take 1 tablet by mouth nightly.       Medication refill denied due to no longer under prescriber's care   negative...